# Patient Record
Sex: FEMALE | Race: BLACK OR AFRICAN AMERICAN | NOT HISPANIC OR LATINO | URBAN - METROPOLITAN AREA
[De-identification: names, ages, dates, MRNs, and addresses within clinical notes are randomized per-mention and may not be internally consistent; named-entity substitution may affect disease eponyms.]

---

## 2018-12-16 ENCOUNTER — EMERGENCY (EMERGENCY)
Facility: HOSPITAL | Age: 28
LOS: 1 days | Discharge: HOME | End: 2018-12-16
Admitting: EMERGENCY MEDICINE
Payer: COMMERCIAL

## 2018-12-16 VITALS
HEART RATE: 78 BPM | DIASTOLIC BLOOD PRESSURE: 76 MMHG | SYSTOLIC BLOOD PRESSURE: 115 MMHG | TEMPERATURE: 99 F | RESPIRATION RATE: 18 BRPM | OXYGEN SATURATION: 100 %

## 2018-12-16 DIAGNOSIS — K14.8 OTHER DISEASES OF TONGUE: ICD-10-CM

## 2018-12-16 DIAGNOSIS — T78.40XA ALLERGY, UNSPECIFIED, INITIAL ENCOUNTER: ICD-10-CM

## 2018-12-16 PROCEDURE — 99283 EMERGENCY DEPT VISIT LOW MDM: CPT | Mod: 25

## 2018-12-16 RX ORDER — DEXAMETHASONE 0.5 MG/5ML
10 ELIXIR ORAL ONCE
Qty: 0 | Refills: 0 | Status: COMPLETED | OUTPATIENT
Start: 2018-12-16 | End: 2018-12-16

## 2018-12-16 RX ADMIN — Medication 10 MILLIGRAM(S): at 23:31

## 2018-12-16 NOTE — ED ADULT TRIAGE NOTE - CHIEF COMPLAINT QUOTE
"im having an allergic reaction, my tongue feels like it is swelling. norma had this before but I don't know what im allergic to". states it has been going on and off for a few days.  no swelling noted, no  rash, no difficulty breathing/speaking, stable.

## 2018-12-16 NOTE — ED ADULT NURSE NOTE - NSIMPLEMENTINTERV_GEN_ALL_ED
Implemented All Universal Safety Interventions:  [place] to call system. Call bell, personal items and telephone within reach. Instruct patient to call for assistance. Room bathroom lighting operational. Non-slip footwear when patient is off stretcher. Physically safe environment: no spills, clutter or unnecessary equipment. Stretcher in lowest position, wheels locked, appropriate side rails in place.

## 2018-12-17 VITALS
OXYGEN SATURATION: 100 % | HEART RATE: 70 BPM | RESPIRATION RATE: 18 BRPM | SYSTOLIC BLOOD PRESSURE: 114 MMHG | DIASTOLIC BLOOD PRESSURE: 73 MMHG

## 2018-12-17 NOTE — ED PROVIDER NOTE - MEDICAL DECISION MAKING DETAILS
subjective localized area of tongue swelling with hx of allergic reaction with similar symptoms.  decadron given and pt reports improvement of symptoms.  declined benadryl.  watched in ed with no progression of symptoms.  no hx of anaphylaxis.  F/u with allergist.

## 2018-12-17 NOTE — ED PROVIDER NOTE - ENMT, MLM
Airway patent, Nasal mucosa clear. Mouth with normal mucosa. Throat has no vesicles, no oropharyngeal exudates and uvula is midline. no objective findings of swelling or soft palate edema, speaking in full sentences

## 2018-12-17 NOTE — ED PROVIDER NOTE - OBJECTIVE STATEMENT
Pt is 29 yo female with pmhx of allergic reaction in the past, last time 2012 where she also had workup and was unable to identify reactant at that time.  Pt reports on and off for the past two days noticing localized area of anterior tongue with swelling and also to areas of her lip which she reports has been moving.  Pt denies any throat swelling, sob, cough, chest pain, or rash/itching.  Pt reports taking Loratadine with some minimal improvement.  Pt denies any new soaps, detergants, foods, medicines, or supplements.  Pt speaking in full sentences and well appearing, NAD.

## 2019-01-22 ENCOUNTER — APPOINTMENT (OUTPATIENT)
Dept: FAMILY MEDICINE | Facility: CLINIC | Age: 29
End: 2019-01-22
Payer: COMMERCIAL

## 2019-01-22 VITALS
DIASTOLIC BLOOD PRESSURE: 65 MMHG | HEART RATE: 77 BPM | WEIGHT: 125 LBS | BODY MASS INDEX: 21.08 KG/M2 | SYSTOLIC BLOOD PRESSURE: 98 MMHG | HEIGHT: 64.57 IN | OXYGEN SATURATION: 97 % | TEMPERATURE: 98.1 F

## 2019-01-22 DIAGNOSIS — Z78.9 OTHER SPECIFIED HEALTH STATUS: ICD-10-CM

## 2019-01-22 DIAGNOSIS — Z84.1 FAMILY HISTORY OF DISORDERS OF KIDNEY AND URETER: ICD-10-CM

## 2019-01-22 PROCEDURE — 99204 OFFICE O/P NEW MOD 45 MIN: CPT

## 2019-01-22 RX ORDER — RANITIDINE HYDROCHLORIDE 150 MG/1
150 CAPSULE ORAL
Qty: 60 | Refills: 3 | Status: ACTIVE | COMMUNITY
Start: 2019-01-22

## 2019-01-22 NOTE — HISTORY OF PRESENT ILLNESS
[FreeTextEntry8] : 27 yo female here for establishment of care. \par \par Patient complaining of allergic reaction in 2012. At that time her lip swelled. Since then her primary in [place] sent her to an allergist and everything was negative. Still having reactions every so often. She will wakeup with eye swelling and cheek swelling. The other day had tongue swelling and was concerned so she went to the ED. She takes ranitidine prn. She was given a steroid shot in the ED and they monitored her. She went home. No swelling since. Carries an epi pen, but needs new script. Had allergy testing 2012, not since. No SOB during these episodes. Feeling well today, no complaints.\par \par Last CPE was June 2018. Last PAP was also in June of last year.

## 2019-01-22 NOTE — PHYSICAL EXAM
[No Acute Distress] : no acute distress [Normal Sclera/Conjunctiva] : normal sclera/conjunctiva [EOMI] : extraocular movements intact [Normal Outer Ear/Nose] : the outer ears and nose were normal in appearance [Normal Oropharynx] : the oropharynx was normal [Normal TMs] : both tympanic membranes were normal [Supple] : supple [No Respiratory Distress] : no respiratory distress  [Clear to Auscultation] : lungs were clear to auscultation bilaterally [No Accessory Muscle Use] : no accessory muscle use [Normal Rate] : normal rate  [Regular Rhythm] : with a regular rhythm [Normal S1, S2] : normal S1 and S2 [No Edema] : there was no peripheral edema [No Joint Swelling] : no joint swelling [No Rash] : no rash [Normal Gait] : normal gait [Normal Affect] : the affect was normal [Normal Insight/Judgement] : insight and judgment were intact

## 2019-02-24 ENCOUNTER — TRANSCRIPTION ENCOUNTER (OUTPATIENT)
Age: 29
End: 2019-02-24

## 2019-03-21 ENCOUNTER — APPOINTMENT (OUTPATIENT)
Dept: DERMATOLOGY | Facility: CLINIC | Age: 29
End: 2019-03-21
Payer: COMMERCIAL

## 2019-03-21 VITALS — DIASTOLIC BLOOD PRESSURE: 73 MMHG | SYSTOLIC BLOOD PRESSURE: 112 MMHG

## 2019-03-21 DIAGNOSIS — Z87.2 PERSONAL HISTORY OF DISEASES OF THE SKIN AND SUBCUTANEOUS TISSUE: ICD-10-CM

## 2019-03-21 DIAGNOSIS — L70.8 OTHER ACNE: ICD-10-CM

## 2019-03-21 PROCEDURE — 99243 OFF/OP CNSLTJ NEW/EST LOW 30: CPT

## 2019-03-21 RX ORDER — TRETINOIN 0.25 MG/G
0.03 CREAM TOPICAL
Qty: 1 | Refills: 2 | Status: ACTIVE | COMMUNITY
Start: 2019-03-21 | End: 1900-01-01

## 2019-03-21 RX ORDER — CLINDAMYCIN PHOSPHATE 10 MG/ML
1 LOTION TOPICAL
Qty: 1 | Refills: 3 | Status: ACTIVE | COMMUNITY
Start: 2019-03-21 | End: 1900-01-01

## 2019-05-08 ENCOUNTER — TRANSCRIPTION ENCOUNTER (OUTPATIENT)
Age: 29
End: 2019-05-08

## 2019-06-20 ENCOUNTER — APPOINTMENT (OUTPATIENT)
Dept: DERMATOLOGY | Facility: CLINIC | Age: 29
End: 2019-06-20

## 2021-03-02 ENCOUNTER — RX RENEWAL (OUTPATIENT)
Age: 31
End: 2021-03-02

## 2021-07-02 ENCOUNTER — APPOINTMENT (OUTPATIENT)
Dept: DERMATOLOGY | Facility: CLINIC | Age: 31
End: 2021-07-02
Payer: COMMERCIAL

## 2021-07-02 DIAGNOSIS — L30.0 NUMMULAR DERMATITIS: ICD-10-CM

## 2021-07-02 PROCEDURE — 99072 ADDL SUPL MATRL&STAF TM PHE: CPT

## 2021-07-02 PROCEDURE — 99214 OFFICE O/P EST MOD 30 MIN: CPT

## 2021-07-02 RX ORDER — EPINEPHRINE 0.3 MG/.3ML
0.3 INJECTION INTRAMUSCULAR
Qty: 1 | Refills: 3 | Status: ACTIVE | COMMUNITY
Start: 2019-01-22 | End: 1900-01-01

## 2021-07-02 RX ORDER — MOMETASONE FUROATE 1 MG/G
0.1 CREAM TOPICAL
Qty: 1 | Refills: 2 | Status: ACTIVE | COMMUNITY
Start: 2019-03-21 | End: 1900-01-01

## 2021-07-07 ENCOUNTER — TRANSCRIPTION ENCOUNTER (OUTPATIENT)
Age: 31
End: 2021-07-07

## 2021-07-07 PROBLEM — L30.0 NUMMULAR DERMATITIS: Status: ACTIVE | Noted: 2019-03-21

## 2021-07-27 ENCOUNTER — LABORATORY RESULT (OUTPATIENT)
Age: 31
End: 2021-07-27

## 2021-07-27 ENCOUNTER — APPOINTMENT (OUTPATIENT)
Dept: PEDIATRIC ALLERGY IMMUNOLOGY | Facility: CLINIC | Age: 31
End: 2021-07-27
Payer: COMMERCIAL

## 2021-07-27 VITALS
DIASTOLIC BLOOD PRESSURE: 66 MMHG | WEIGHT: 130 LBS | BODY MASS INDEX: 21.66 KG/M2 | HEART RATE: 84 BPM | OXYGEN SATURATION: 99 % | HEIGHT: 65 IN | SYSTOLIC BLOOD PRESSURE: 103 MMHG | TEMPERATURE: 96.3 F

## 2021-07-27 DIAGNOSIS — L50.5 CHOLINERGIC URTICARIA: ICD-10-CM

## 2021-07-27 DIAGNOSIS — T78.3XXA ANGIONEUROTIC EDEMA, INITIAL ENCOUNTER: ICD-10-CM

## 2021-07-27 PROCEDURE — 36415 COLL VENOUS BLD VENIPUNCTURE: CPT

## 2021-07-27 PROCEDURE — 99203 OFFICE O/P NEW LOW 30 MIN: CPT | Mod: 25

## 2021-08-05 PROBLEM — T78.3XXA ANGIOEDEMA: Status: ACTIVE | Noted: 2019-03-21

## 2021-08-05 PROBLEM — L50.5 CHOLINERGIC URTICARIA: Status: ACTIVE | Noted: 2021-08-05

## 2021-08-05 NOTE — REVIEW OF SYSTEMS
[Nl] : Genitourinary [Immunizations are up to date] : Immunizations are up to date [FreeTextEntry1] : s/p both doses of the COVID vaccine

## 2021-08-05 NOTE — PHYSICAL EXAM
[Alert] : alert [Well Nourished] : well nourished [Healthy Appearance] : healthy appearance [No Acute Distress] : no acute distress [Well Developed] : well developed [Normal Pupil & Iris Size/Symmetry] : normal pupil and iris size and symmetry [No Discharge] : no discharge [No Photophobia] : no photophobia [Sclera Not Icteric] : sclera not icteric [Normal TMs] : both tympanic membranes were normal [Normal Nasal Mucosa] : the nasal mucosa was normal [Normal Lips/Tongue] : the lips and tongue were normal [Normal Outer Ear/Nose] : the ears and nose were normal in appearance [Normal Tonsils] : normal tonsils [No Thrush] : no thrush [Supple] : the neck was supple [Normal Rate and Effort] : normal respiratory rhythm and effort [No Crackles] : no crackles [No Retractions] : no retractions [Bilateral Audible Breath Sounds] : bilateral audible breath sounds [Normal Rate] : heart rate was normal  [Normal S1, S2] : normal S1 and S2 [No murmur] : no murmur [Regular Rhythm] : with a regular rhythm [Soft] : abdomen soft [Not Tender] : non-tender [Not Distended] : not distended [No HSM] : no hepato-splenomegaly [Normal Cervical Lymph Nodes] : cervical [Skin Intact] : skin intact  [No Rash] : no rash [No Skin Lesions] : no skin lesions [No clubbing] : no clubbing [No Edema] : no edema [No Cyanosis] : no cyanosis [Normal Mood] : mood was normal [Normal Affect] : affect was normal [Alert, Awake, Oriented as Age-Appropriate] : alert, awake, oriented as age appropriate [Pale mucosa] : no pale mucosa [de-identified] : dermatographic

## 2021-08-05 NOTE — SOCIAL HISTORY
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

## 2021-08-05 NOTE — CONSULT LETTER
[Dear  ___] : Dear  [unfilled], [Consult Letter:] : I had the pleasure of evaluating your patient, [unfilled]. [Please see my note below.] : Please see my note below. [Consult Closing:] : Thank you very much for allowing me to participate in the care of this patient.  If you have any questions, please do not hesitate to contact me. [Sincerely,] : Sincerely, [FreeTextEntry2] : Fiona Pool, DO [FreeTextEntry3] : Luz Crooks MD\par Attending Physician \par Division of Allergy/Immunology \par [facility] \par \par  of Medicine and Pediatrics\par [facility] \par \par [address]\[place] [address]\par Tel: [phone]\par Fax: [phone]\par Email: [email or web address]\par \par \par \par

## 2021-08-06 LAB
ALBUMIN SERPL ELPH-MCNC: 4.3 G/DL
ALP BLD-CCNC: 38 U/L
ALT SERPL-CCNC: 13 U/L
ANION GAP SERPL CALC-SCNC: 11 MMOL/L
AST SERPL-CCNC: 16 U/L
BASOPHILS # BLD AUTO: 0.04 K/UL
BASOPHILS NFR BLD AUTO: 0.7 %
BILIRUB SERPL-MCNC: 0.3 MG/DL
BUN SERPL-MCNC: 8 MG/DL
CALCIUM SERPL-MCNC: 9.7 MG/DL
CHLORIDE SERPL-SCNC: 101 MMOL/L
CHRONIC URTICARIA PANEL (CU INDEX): 4
CO2 SERPL-SCNC: 25 MMOL/L
CREAT SERPL-MCNC: 0.64 MG/DL
EOSINOPHIL # BLD AUTO: 0.12 K/UL
EOSINOPHIL NFR BLD AUTO: 2.2 %
ERYTHROCYTE [SEDIMENTATION RATE] IN BLOOD BY WESTERGREN METHOD: 32 MM/HR
GLUCOSE SERPL-MCNC: 107 MG/DL
HCT VFR BLD CALC: 32.1 %
HGB BLD-MCNC: 9.3 G/DL
IGE RECEPTOR AB INTERPRETATION: NORMAL
IGE RECEPTOR AB: 8.1 %
IGE RECEPTOR COMMENT: NORMAL
IMM GRANULOCYTES NFR BLD AUTO: 0 %
LYMPHOCYTES # BLD AUTO: 2.17 K/UL
LYMPHOCYTES NFR BLD AUTO: 40.5 %
MAN DIFF?: NORMAL
MCHC RBC-ENTMCNC: 19.5 PG
MCHC RBC-ENTMCNC: 29 GM/DL
MCV RBC AUTO: 67.4 FL
MONOCYTES # BLD AUTO: 0.48 K/UL
MONOCYTES NFR BLD AUTO: 9 %
NEUTROPHILS # BLD AUTO: 2.55 K/UL
NEUTROPHILS NFR BLD AUTO: 47.6 %
PLATELET # BLD AUTO: 321 K/UL
POTASSIUM SERPL-SCNC: 4 MMOL/L
PROT SERPL-MCNC: 7.4 G/DL
RBC # BLD: 4.76 M/UL
RBC # FLD: 18 %
SODIUM SERPL-SCNC: 137 MMOL/L
T4 FREE SERPL-MCNC: 1.3 NG/DL
THYROGLOB AB SERPL-ACNC: <20 IU/ML
THYROPEROXIDASE AB SERPL IA-ACNC: 11.6 IU/ML
TRYPTASE: 3.7 UG/L
TSH RECEPTOR AB: <1.1 IU/L
TSH SERPL-ACNC: 1.07 UIU/ML
WBC # FLD AUTO: 5.36 K/UL

## 2021-09-28 ENCOUNTER — APPOINTMENT (OUTPATIENT)
Dept: FAMILY MEDICINE | Facility: CLINIC | Age: 31
End: 2021-09-28
Payer: COMMERCIAL

## 2021-09-28 ENCOUNTER — LABORATORY RESULT (OUTPATIENT)
Age: 31
End: 2021-09-28

## 2021-09-28 VITALS
TEMPERATURE: 97.7 F | OXYGEN SATURATION: 100 % | HEIGHT: 65 IN | HEART RATE: 76 BPM | SYSTOLIC BLOOD PRESSURE: 113 MMHG | BODY MASS INDEX: 21.99 KG/M2 | DIASTOLIC BLOOD PRESSURE: 72 MMHG | RESPIRATION RATE: 16 BRPM | WEIGHT: 132 LBS

## 2021-09-28 DIAGNOSIS — E61.1 IRON DEFICIENCY: ICD-10-CM

## 2021-09-28 DIAGNOSIS — Z13.220 ENCOUNTER FOR SCREENING FOR LIPOID DISORDERS: ICD-10-CM

## 2021-09-28 DIAGNOSIS — Z13.1 ENCOUNTER FOR SCREENING FOR DIABETES MELLITUS: ICD-10-CM

## 2021-09-28 DIAGNOSIS — Z13.21 ENCOUNTER FOR SCREENING FOR NUTRITIONAL DISORDER: ICD-10-CM

## 2021-09-28 DIAGNOSIS — Z00.00 ENCOUNTER FOR GENERAL ADULT MEDICAL EXAMINATION W/OUT ABNORMAL FINDINGS: ICD-10-CM

## 2021-09-28 DIAGNOSIS — T78.40XA ALLERGY, UNSPECIFIED, INITIAL ENCOUNTER: ICD-10-CM

## 2021-09-28 DIAGNOSIS — Z11.3 ENCOUNTER FOR SCREENING FOR INFECTIONS WITH A PREDOMINANTLY SEXUAL MODE OF TRANSMISSION: ICD-10-CM

## 2021-09-28 DIAGNOSIS — R59.9 ENLARGED LYMPH NODES, UNSPECIFIED: ICD-10-CM

## 2021-09-28 DIAGNOSIS — Z13.29 ENCOUNTER FOR SCREENING FOR OTHER SUSPECTED ENDOCRINE DISORDER: ICD-10-CM

## 2021-09-28 PROCEDURE — 36415 COLL VENOUS BLD VENIPUNCTURE: CPT

## 2021-09-28 PROCEDURE — 99395 PREV VISIT EST AGE 18-39: CPT | Mod: 25

## 2021-09-28 NOTE — HEALTH RISK ASSESSMENT
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

## 2021-09-28 NOTE — HISTORY OF PRESENT ILLNESS
[de-identified] : 32 yo female here for CPE. \par \par Recently saw allergist for workup. Had elevated ESR. Needs repeat levels today.\par \par Stating that in March of 2020 she had prominent lymph nodes. Went to urgent care and was treated for strep infection. Went away for a few weeks, then came back. Was sent to  at [facility]. Has MRI of neck which showed nothing of concern. Went away on its own. Also had body aches and chills at that time. All has resolved since but never knew what was going on.

## 2021-09-28 NOTE — ASSESSMENT
[FreeTextEntry1] : CPE- Well exam, comprehensive labs ordered. STD panel ordered. PAP up to date. Follow up labs

## 2021-10-01 LAB
25(OH)D3 SERPL-MCNC: 21.9 NG/ML
ALBUMIN SERPL ELPH-MCNC: 4.5 G/DL
ALP BLD-CCNC: 36 U/L
ALT SERPL-CCNC: 8 U/L
ANION GAP SERPL CALC-SCNC: 19 MMOL/L
APPEARANCE: CLEAR
AST SERPL-CCNC: 17 U/L
BACTERIA: NEGATIVE
BASOPHILS # BLD AUTO: 0.02 K/UL
BASOPHILS NFR BLD AUTO: 0.4 %
BILIRUB SERPL-MCNC: 0.3 MG/DL
BILIRUBIN URINE: NEGATIVE
BLOOD URINE: NEGATIVE
BUN SERPL-MCNC: 10 MG/DL
C TRACH RRNA SPEC QL NAA+PROBE: NOT DETECTED
CALCIUM SERPL-MCNC: 9.7 MG/DL
CHLORIDE SERPL-SCNC: 102 MMOL/L
CHOLEST SERPL-MCNC: 193 MG/DL
CO2 SERPL-SCNC: 15 MMOL/L
COLOR: NORMAL
CREAT SERPL-MCNC: 0.79 MG/DL
EOSINOPHIL # BLD AUTO: 0.12 K/UL
EOSINOPHIL NFR BLD AUTO: 2.2 %
ERYTHROCYTE [SEDIMENTATION RATE] IN BLOOD BY WESTERGREN METHOD: 34 MM/HR
ESTIMATED AVERAGE GLUCOSE: 114 MG/DL
FERRITIN SERPL-MCNC: 5 NG/ML
FOLATE SERPL-MCNC: 14.1 NG/ML
GLUCOSE QUALITATIVE U: NEGATIVE
GLUCOSE SERPL-MCNC: 84 MG/DL
HAV IGM SER QL: NONREACTIVE
HBA1C MFR BLD HPLC: 5.6 %
HBV CORE IGM SER QL: NONREACTIVE
HBV SURFACE AG SER QL: NONREACTIVE
HCT VFR BLD CALC: 33.6 %
HCV AB SER QL: NONREACTIVE
HCV S/CO RATIO: 0.17 S/CO
HDLC SERPL-MCNC: 76 MG/DL
HGB BLD-MCNC: 9.5 G/DL
HIV1+2 AB SPEC QL IA.RAPID: NONREACTIVE
HYALINE CASTS: 1 /LPF
IMM GRANULOCYTES NFR BLD AUTO: 0.2 %
IRON SATN MFR SERPL: 8 %
IRON SERPL-MCNC: 31 UG/DL
KETONES URINE: NEGATIVE
LDLC SERPL CALC-MCNC: 104 MG/DL
LEUKOCYTE ESTERASE URINE: NEGATIVE
LYMPHOCYTES # BLD AUTO: 2.4 K/UL
LYMPHOCYTES NFR BLD AUTO: 44.1 %
MAN DIFF?: NORMAL
MCHC RBC-ENTMCNC: 19 PG
MCHC RBC-ENTMCNC: 28.3 GM/DL
MCV RBC AUTO: 67.1 FL
MICROSCOPIC-UA: NORMAL
MONOCYTES # BLD AUTO: 0.45 K/UL
MONOCYTES NFR BLD AUTO: 8.3 %
N GONORRHOEA RRNA SPEC QL NAA+PROBE: NOT DETECTED
NEUTROPHILS # BLD AUTO: 2.44 K/UL
NEUTROPHILS NFR BLD AUTO: 44.8 %
NITRITE URINE: NEGATIVE
NONHDLC SERPL-MCNC: 117 MG/DL
PH URINE: 5.5
PLATELET # BLD AUTO: 303 K/UL
POTASSIUM SERPL-SCNC: 4.4 MMOL/L
PROT SERPL-MCNC: 7.4 G/DL
PROTEIN URINE: NEGATIVE
RBC # BLD: 5.01 M/UL
RBC # FLD: 18.8 %
RED BLOOD CELLS URINE: 2 /HPF
SODIUM SERPL-SCNC: 136 MMOL/L
SOURCE AMPLIFICATION: NORMAL
SPECIFIC GRAVITY URINE: 1.01
SQUAMOUS EPITHELIAL CELLS: 2 /HPF
T PALLIDUM AB SER QL IA: NEGATIVE
T4 FREE SERPL-MCNC: 1.4 NG/DL
TIBC SERPL-MCNC: 389 UG/DL
TRANSFERRIN SERPL-MCNC: 346 MG/DL
TRIGL SERPL-MCNC: 62 MG/DL
TSH SERPL-ACNC: 2.22 UIU/ML
UIBC SERPL-MCNC: 358 UG/DL
UROBILINOGEN URINE: NORMAL
VIT B12 SERPL-MCNC: 746 PG/ML
WBC # FLD AUTO: 5.44 K/UL
WHITE BLOOD CELLS URINE: 1 /HPF

## 2021-10-11 ENCOUNTER — TRANSCRIPTION ENCOUNTER (OUTPATIENT)
Age: 31
End: 2021-10-11

## 2021-11-11 ENCOUNTER — TRANSCRIPTION ENCOUNTER (OUTPATIENT)
Age: 31
End: 2021-11-11

## 2021-11-28 ENCOUNTER — EMERGENCY (EMERGENCY)
Facility: HOSPITAL | Age: 31
LOS: 1 days | Discharge: HOME | End: 2021-11-28
Attending: EMERGENCY MEDICINE | Admitting: EMERGENCY MEDICINE
Payer: COMMERCIAL

## 2021-11-28 VITALS
RESPIRATION RATE: 18 BRPM | WEIGHT: 132.06 LBS | HEART RATE: 81 BPM | OXYGEN SATURATION: 100 % | TEMPERATURE: 98 F | DIASTOLIC BLOOD PRESSURE: 66 MMHG | HEIGHT: 65 IN | SYSTOLIC BLOOD PRESSURE: 105 MMHG

## 2021-11-28 DIAGNOSIS — M79.662 PAIN IN LEFT LOWER LEG: ICD-10-CM

## 2021-11-28 DIAGNOSIS — R58 HEMORRHAGE, NOT ELSEWHERE CLASSIFIED: ICD-10-CM

## 2021-11-28 DIAGNOSIS — M25.562 PAIN IN LEFT KNEE: ICD-10-CM

## 2021-11-28 DIAGNOSIS — L81.9 DISORDER OF PIGMENTATION, UNSPECIFIED: ICD-10-CM

## 2021-11-28 PROCEDURE — 99283 EMERGENCY DEPT VISIT LOW MDM: CPT | Mod: 25

## 2021-11-28 PROCEDURE — 73590 X-RAY EXAM OF LOWER LEG: CPT | Mod: 26,LT

## 2021-11-28 PROCEDURE — 73590 X-RAY EXAM OF LOWER LEG: CPT

## 2021-11-28 NOTE — ED PROVIDER NOTE - NSFOLLOWUPINSTRUCTIONS_ED_ALL_ED_FT
Your xray showed no findings. As discussed, take Motrin or Advil as needed for pain. Ice the area. It should resolve in 1-2 weeks.     Ecchymosis    WHAT YOU NEED TO KNOW:    Ecchymosis is a collection of blood under the skin. Blood leaks from blood vessels and collects in nearby tissues. This can happen anywhere just below the skin, or in a mucus membrane, such as your mouth. Ecchymosis may appear as a large red, blue, or purple area of skin. You may also have pain or swelling in the area. Signs and symptoms may move to nearby body areas. It is important for you to follow up with your healthcare provider. Some causes of ecchymosis are serious and need medical treatment.    DISCHARGE INSTRUCTIONS:    Call your doctor if:   •You have new symptoms.      •Your bruise suddenly gets bigger and feels hard.      •The affected area does not improve within 2 weeks.      •You have ecchymosis around your eye and you are having trouble seeing.      •You have questions or concerns about your condition or care.      Self-care:     R.I.C.E.     •Rest the area to help the tissues heal.      •Apply ice to the area to relieve pain and swelling. Ice can also help prevent tissue damage. Use an ice pack, or put crushed ice in a bag. Cover the ice pack or bag with a small towel before you apply it to your skin. Apply ice for 20 minutes every hour, or as directed.      •Elevate the affected area to reduce swelling, and to improve circulation. Prop the area on pillows to keep it elevated above the level of your heart. Do this as often as possible.      •NSAID medicines such as ibuprofen can help reduce pain and swelling. NSAIDs are available without a prescription. Ask your healthcare provider which medicine is right for you. Ask how much to take and how often to take it. Follow directions. NSAIDs can cause stomach bleeding and kidney damage if not taken correctly. If you take blood thinner medicine, always ask if NSAIDs are safe for you.      Follow up with your healthcare provider as directed: Write down your questions so you remember to ask them during your visits.      Erythema Nodosum    WHAT YOU NEED TO KNOW:    What is erythema nodosum (EN)? EN is a type of inflammatory disease. EN causes tender, red bumps to form under your skin. The bumps may be hot to the touch. EN develops when the fat layer under your skin becomes inflamed. You may also see bruising. The bumps are most common on your thighs, knees, shins, ankles, or feet. The bumps may be light red at first, but then they darken and may look purple or brown. You may be tired or feel like you have the flu. You may have a fever, swollen lymph glands, or joint pain.    What increases my risk for EN? The exact cause may not be known. EN may be triggered by infections, such as strep throat, mono, hepatitis B, or tuberculosis. EN may also develop in reaction to medicines such as aspirin, antibiotics, or birth control pills. Inflammatory bowel disease, pregnancy, leukemia, sarcoidosis, and rheumatic fever have also been linked to EN.    How is EN diagnosed? Your healthcare provider will examine you. He or she can usually diagnose this condition by looking at your bumps. You may need any of the following:   •Blood tests may show a possible cause for your EN.      •A throat swab may be needed to check for a strep infection.      •A chest x-ray may be needed to check for sarcoidosis or tuberculosis.      •A punch biopsy of one or more bumps may be done. This test may help find what is causing your EN.      How is EN treated? The tenderness usually lasts about 2 weeks. The red, lumpy areas usually go away on their own in 6 to 8 weeks. It is not common to have scars or open wounds with EN. Treatment may depend on what is causing your EN. For example, if you have a strep infection, you may be given antibiotics. Your medicine may be changed if it is causing your symptoms. You may also need any of the following:   •NSAIDs help decrease swelling and pain or fever. This medicine is available with or without a doctor's order. NSAIDs can cause stomach bleeding or kidney problems in certain people. If you take blood thinner medicine, always ask your healthcare provider if NSAIDs are safe for you. Always read the medicine label and follow directions.      •Prescription pain medicine may be given. Ask your healthcare provider how to take this medicine safely. Some prescription pain medicines contain acetaminophen. Do not take other medicines that contain acetaminophen without talking to your healthcare provider. Too much acetaminophen may cause liver damage. Prescription pain medicine may cause constipation. Ask your healthcare provider how to prevent or treat constipation.       •Potassium iodide may help relieve your symptoms.      •Steroids may be needed to decrease inflammation.       How can I manage my symptoms?   •Rest as much as you can.      •Elevate the affected area above the level of your heart as often as you can. This will help decrease swelling and pain. Prop the area on pillows or blankets to keep it elevated comfortably.       •An elastic bandage or support stockings may help decrease pain when you are up and walking.      •Apply a warm or cold compress on the affected area to decrease pain and swelling.       When should I contact my healthcare provider?   •Your symptoms do not go away after 8 weeks.      •Your symptoms go away and then come back.      •You have questions or concerns about your condition or care.      CARE AGREEMENT:    You have the right to help plan your care. Learn about your health condition and how it may be treated. Discuss treatment options with your healthcare providers to decide what care you want to receive. You always have the right to refuse treatment. Your xray showed no findings. As discussed, this is either a bruise, or Erythema Nodusum (read below, self-limited). The treatment is the same. Take Motrin or Advil as needed for pain. Ice the area. It should resolve in 1-2 weeks. Follow up with your regular medical doctor.     Ecchymosis    WHAT YOU NEED TO KNOW:    Ecchymosis is a collection of blood under the skin. Blood leaks from blood vessels and collects in nearby tissues. This can happen anywhere just below the skin, or in a mucus membrane, such as your mouth. Ecchymosis may appear as a large red, blue, or purple area of skin. You may also have pain or swelling in the area. Signs and symptoms may move to nearby body areas. It is important for you to follow up with your healthcare provider. Some causes of ecchymosis are serious and need medical treatment.    DISCHARGE INSTRUCTIONS:    Call your doctor if:   •You have new symptoms.      •Your bruise suddenly gets bigger and feels hard.      •The affected area does not improve within 2 weeks.      •You have ecchymosis around your eye and you are having trouble seeing.      •You have questions or concerns about your condition or care.      Self-care:     R.I.C.E.     •Rest the area to help the tissues heal.      •Apply ice to the area to relieve pain and swelling. Ice can also help prevent tissue damage. Use an ice pack, or put crushed ice in a bag. Cover the ice pack or bag with a small towel before you apply it to your skin. Apply ice for 20 minutes every hour, or as directed.      •Elevate the affected area to reduce swelling, and to improve circulation. Prop the area on pillows to keep it elevated above the level of your heart. Do this as often as possible.      •NSAID medicines such as ibuprofen can help reduce pain and swelling. NSAIDs are available without a prescription. Ask your healthcare provider which medicine is right for you. Ask how much to take and how often to take it. Follow directions. NSAIDs can cause stomach bleeding and kidney damage if not taken correctly. If you take blood thinner medicine, always ask if NSAIDs are safe for you.      Follow up with your healthcare provider as directed: Write down your questions so you remember to ask them during your visits.      Erythema Nodosum    WHAT YOU NEED TO KNOW:    What is erythema nodosum (EN)? EN is a type of inflammatory disease. EN causes tender, red bumps to form under your skin. The bumps may be hot to the touch. EN develops when the fat layer under your skin becomes inflamed. You may also see bruising. The bumps are most common on your thighs, knees, shins, ankles, or feet. The bumps may be light red at first, but then they darken and may look purple or brown. You may be tired or feel like you have the flu. You may have a fever, swollen lymph glands, or joint pain.    What increases my risk for EN? The exact cause may not be known. EN may be triggered by infections, such as strep throat, mono, hepatitis B, or tuberculosis. EN may also develop in reaction to medicines such as aspirin, antibiotics, or birth control pills. Inflammatory bowel disease, pregnancy, leukemia, sarcoidosis, and rheumatic fever have also been linked to EN.    How is EN diagnosed? Your healthcare provider will examine you. He or she can usually diagnose this condition by looking at your bumps. You may need any of the following:   •Blood tests may show a possible cause for your EN.      •A throat swab may be needed to check for a strep infection.      •A chest x-ray may be needed to check for sarcoidosis or tuberculosis.      •A punch biopsy of one or more bumps may be done. This test may help find what is causing your EN.      How is EN treated? The tenderness usually lasts about 2 weeks. The red, lumpy areas usually go away on their own in 6 to 8 weeks. It is not common to have scars or open wounds with EN. Treatment may depend on what is causing your EN. For example, if you have a strep infection, you may be given antibiotics. Your medicine may be changed if it is causing your symptoms. You may also need any of the following:   •NSAIDs help decrease swelling and pain or fever. This medicine is available with or without a doctor's order. NSAIDs can cause stomach bleeding or kidney problems in certain people. If you take blood thinner medicine, always ask your healthcare provider if NSAIDs are safe for you. Always read the medicine label and follow directions.      •Prescription pain medicine may be given. Ask your healthcare provider how to take this medicine safely. Some prescription pain medicines contain acetaminophen. Do not take other medicines that contain acetaminophen without talking to your healthcare provider. Too much acetaminophen may cause liver damage. Prescription pain medicine may cause constipation. Ask your healthcare provider how to prevent or treat constipation.       •Potassium iodide may help relieve your symptoms.      •Steroids may be needed to decrease inflammation.       How can I manage my symptoms?   •Rest as much as you can.      •Elevate the affected area above the level of your heart as often as you can. This will help decrease swelling and pain. Prop the area on pillows or blankets to keep it elevated comfortably.       •An elastic bandage or support stockings may help decrease pain when you are up and walking.      •Apply a warm or cold compress on the affected area to decrease pain and swelling.       When should I contact my healthcare provider?   •Your symptoms do not go away after 8 weeks.      •Your symptoms go away and then come back.      •You have questions or concerns about your condition or care.      CARE AGREEMENT:    You have the right to help plan your care. Learn about your health condition and how it may be treated. Discuss treatment options with your healthcare providers to decide what care you want to receive. You always have the right to refuse treatment.

## 2021-11-28 NOTE — ED ADULT TRIAGE NOTE - CHIEF COMPLAINT QUOTE
Pt presents to ED c/o left leg pain/swelling x 5 days. Denies injury/trauma. Reporting bruising and swelling to L shin and L knee. Ambulatory w steady gait.

## 2021-11-28 NOTE — ED PROVIDER NOTE - OBJECTIVE STATEMENT
Pt w/ PMHx COLEEN p/w L anterior shin pain / swelling, and L medial knee pain. No trauma. No AC use. No LE edema, or posterior leg pain. No numbness/tingling, nor weakness. + URI 1 week ago w/ throat infection ,recently tx'd for UT. no current f/c, nor infectious sx.

## 2021-11-28 NOTE — ED PROVIDER NOTE - PHYSICAL EXAMINATION
Constitutional: Well appearing, awake, alert, oriented to person, place, time/situation and in no apparent distress.  ENMT: Airway patent.   Eyes: Clear bilaterally  Cardiac: Normal rate, regular rhythm.   Respiratory: No increased WOB, tachypnea, hypoxia, or accessory mm use. Pt speaks in full sentences.   Musculoskeletal: Range of motion is not limited. FROM knees. no swelling / deformity / erythema / warmth. 2+ pedal pulses  Neuro: Alert and oriented x 3, face symmetric and speech fluent. Nml gross motor movement, grossly non focal   Skin: Skin normal color for race, warm, dry and intact. No evidence of rash. + hyperpigmented, raised tender nodule to L anterior chin, midshaft. no fluctuance nor warmth  Psych: Alert and oriented to person, place, time/situation. normal mood and affect. no apparent risk to self or others.

## 2021-11-28 NOTE — ED ADULT NURSE NOTE - OBJECTIVE STATEMENT
Patient presents to ED c/o 6/10 pain to the left shin and left knee for 5 days. Patient denies trauma or injury. Patient presents with discoloration and swelling to the left medial shin with lump under skin and pain on palpation. Patient walks with a steady gait without difficulty. No PMH. NKA. Patient is vaccinated against COVID.

## 2021-11-28 NOTE — ED PROVIDER NOTE - PATIENT PORTAL LINK FT
You can access the FollowMyHealth Patient Portal offered by [facility] by registering at the following website: [email or web address]. By joining [facility]’s FollowMyHealth portal, you will also be able to view your health information using other applications (apps) compatible with our system.

## 2021-11-28 NOTE — ED PROVIDER NOTE - CLINICAL SUMMARY MEDICAL DECISION MAKING FREE TEXT BOX
Anterior lower leg swelling pain. Ecchymosis vs erythema nodosum, although isolated, single lesion. XR, declines analgesia. D/w pt supportive care w/ NSAIDs, f/u PCP

## 2022-01-06 ENCOUNTER — NON-APPOINTMENT (OUTPATIENT)
Age: 32
End: 2022-01-06

## 2022-01-07 ENCOUNTER — TRANSCRIPTION ENCOUNTER (OUTPATIENT)
Age: 32
End: 2022-01-07

## 2022-01-07 RX ORDER — FERROUS GLUCONATE 240(27)MG
240 (27 FE) TABLET ORAL
Qty: 180 | Refills: 0 | Status: ACTIVE | COMMUNITY
Start: 2022-01-07 | End: 1900-01-01

## 2022-01-09 ENCOUNTER — NON-APPOINTMENT (OUTPATIENT)
Age: 32
End: 2022-01-09

## 2022-01-14 ENCOUNTER — TRANSCRIPTION ENCOUNTER (OUTPATIENT)
Age: 32
End: 2022-01-14

## 2024-08-16 NOTE — ED PROVIDER NOTE - CONDUCTED A DETAILED DISCUSSION WITH PATIENT AND/OR GUARDIAN REGARDING, MDM
need for outpatient follow-up/return to ED if symptoms worsen, persist or questions arise independent

## 2025-01-08 NOTE — HISTORY OF PRESENT ILLNESS
Successful ear cleaning   Orders:    Ear Cerumen Removal; Future     [de-identified] : Jennie is a 30 year old woman with a history of urticaria and angioedema who presents for initial allergy evaluation.\par \par History of hives associated with exercise - was running on the treadmill, felt lightheaded about 30 minutes after running, had facial angioedema and hives.\par Pt gets hives and angioedema whether running indoors or outdoors. Indoors this has occurred multiple times. Pt cut back on running outdoors. \par Noticed light headedness the last 2 times she was running. \par No fainting. \par \par No allergy sx. \par Food allergy: No suspicion for food allergy.  Tolerates milk, eggs, wheat, soy, peanut, tree nut, fish and shellfish.\par Some eczema on her extremities.\par \par She had episodes of angioedema in 2014, 2018, and 2021 - under stress at all of those times. \par \par Feb/March 2020 she had a very bad sore throat and very enlarged lymph nodes.  She had fever, night sweats, hives.  Had a CT scan of her neck. Took tylenol. She had COVID antibody testing in June and this was negative.
